# Patient Record
Sex: FEMALE | Race: WHITE | NOT HISPANIC OR LATINO | Employment: UNEMPLOYED | URBAN - METROPOLITAN AREA
[De-identification: names, ages, dates, MRNs, and addresses within clinical notes are randomized per-mention and may not be internally consistent; named-entity substitution may affect disease eponyms.]

---

## 2023-04-05 ENCOUNTER — ULTRASOUND (OUTPATIENT)
Dept: OBGYN CLINIC | Facility: CLINIC | Age: 28
End: 2023-04-05

## 2023-04-05 ENCOUNTER — TELEPHONE (OUTPATIENT)
Dept: OBGYN CLINIC | Facility: CLINIC | Age: 28
End: 2023-04-05

## 2023-04-05 VITALS — HEART RATE: 78 BPM | SYSTOLIC BLOOD PRESSURE: 172 MMHG | DIASTOLIC BLOOD PRESSURE: 94 MMHG | WEIGHT: 143 LBS

## 2023-04-05 DIAGNOSIS — N92.6 MISSED MENSES: Primary | ICD-10-CM

## 2023-04-05 LAB
BILIRUB UR QL STRIP: NEGATIVE
CLARITY UR: CLEAR
COLOR UR: NORMAL
GLUCOSE UR STRIP-MCNC: NEGATIVE MG/DL
HGB UR QL STRIP.AUTO: NEGATIVE
KETONES UR STRIP-MCNC: NEGATIVE MG/DL
LEUKOCYTE ESTERASE UR QL STRIP: NEGATIVE
NITRITE UR QL STRIP: NEGATIVE
PH UR STRIP.AUTO: 7 [PH]
PROT UR STRIP-MCNC: NEGATIVE MG/DL
SP GR UR STRIP.AUTO: 1.01 (ref 1–1.04)
UROBILINOGEN UA: NEGATIVE MG/DL

## 2023-04-05 NOTE — TELEPHONE ENCOUNTER
Pt informed, via  #[id number], that her lab levels were normal & she is not pregnant & can resume her birth control pills  Pt reminded about her appt on the 13th for birth control consult  Pt verbalized understanding  Pt asked if she can change the date of her apppointment, I said yes & she said she will call back to do that

## 2023-04-05 NOTE — PROGRESS NOTES
[address] [place] [address]   ULTRASOUND VISIT     SUBJECTIVE:  [employer] interpretor Veronique Alfaro [id number] used for Protuguese interpretation  Brief HPI: Gabriela Layton is a 32 y o  P2 female who presents for viability scan and dating for new pregnancy  Patient unsure of exact LMP, but states it was early b    Had a positive home pregnancy test, and subsequently had a negative home pregnancy test two Sundays ago  Denies bleeding, cramping, etc    Patient has two prior pregnancies  She does not desire to be pregnant at this time  OB History    Para Term  AB Living   2 2           SAB IAB Ectopic Multiple Live Births                  # Outcome Date GA Lbr Himanshu/2nd Weight Sex Delivery Anes PTL Lv   2 Para            1 Para                No past medical history on file  No past surgical history on file  Review of Systems   All other systems reviewed and are negative  OBJECTIVE:  There were no vitals filed for this visit  Physical Exam  Vitals reviewed  Constitutional:       General: She is not in acute distress  Appearance: She is well-developed  HENT:      Head: Normocephalic and atraumatic  Eyes:      Conjunctiva/sclera: Conjunctivae normal    Cardiovascular:      Rate and Rhythm: Normal rate  Pulmonary:      Effort: Pulmonary effort is normal    Abdominal:      General: There is no distension  Palpations: Abdomen is soft  Tenderness: There is no abdominal tenderness  Genitourinary:     Comments: Normal external genitalia  Musculoskeletal:         General: Normal range of motion  Skin:     General: Skin is warm and dry  Neurological:      General: No focal deficit present  Mental Status: She is alert and oriented to person, place, and time  Mental status is at baseline  Psychiatric:         Mood and Affect: Mood normal          Behavior: Behavior normal          Thought Content:  Thought content normal          FIRST TRIMESTER OBSTETRIC ULTRASOUND  INDICATION: Amenorrhea, viability  COMPARISON: None  TECHNIQUE:   Transvaginal imaging was performed to assess the gestation, myometrial/endometrial architecture and ovarian parenchymal detail  The study includes volumetric sweeps and traditional still imaging technique  FINDINGS:  UTERUS:  Retroverted uterus measuring, non-enlarged  No free fluid identified  Cervix appears normal    There is no intrauterine gestation identified  The uterine lining appears thin  ADNEXA:   Left ovary:  Left ovary normal in appearance  No adnexal mass or pathologic cyst       Right ovary:  Right ovary normal in appearance  No adnexal mass or pathologic cyst      IMPRESSION:  No intrauterine or extrauterine gestation identified  No free fluid  Uterus and adnexa normal in appearance  ASSESSMENT/PLAN:  Patient unlikely to be pregnant  Had negative pregnancy test at home   Did not provide urine sample for analysis today  HCG, progesterone ordered along with prenatal labs   Will notify patient of results  If not pregnant, patient can resume OCP  She is contemplating switching to another method  RTC 1 week to discuss contraception options  Update at 1434: serum HCG resulted < 1  Patient is not pregnant  Patient notified  Will return in 1 week to discuss contraception      Lang George MD  PGY-4, OBGYN  04/05/23

## 2023-04-05 NOTE — TELEPHONE ENCOUNTER
----- Message from Jerel Richard MD sent at 4/5/2023  5:17 PM EDT -----  Patient got bloodwork done   HCG is <1  She is not pregnant  Please call to notify her  She can resume OCP and return in 1 week to discuss birth control options

## 2024-10-09 ENCOUNTER — TELEPHONE (OUTPATIENT)
Dept: INTERNAL MEDICINE CLINIC | Facility: CLINIC | Age: 29
End: 2024-10-09

## 2025-07-08 ENCOUNTER — HOSPITAL ENCOUNTER (EMERGENCY)
Facility: HOSPITAL | Age: 30
Discharge: HOME | End: 2025-07-08
Attending: EMERGENCY MEDICINE

## 2025-07-08 ENCOUNTER — APPOINTMENT (EMERGENCY)
Dept: CT IMAGING | Facility: HOSPITAL | Age: 30
End: 2025-07-08

## 2025-07-08 ENCOUNTER — APPOINTMENT (EMERGENCY)
Dept: RADIOLOGY | Facility: HOSPITAL | Age: 30
End: 2025-07-08

## 2025-07-08 VITALS
RESPIRATION RATE: 18 BRPM | SYSTOLIC BLOOD PRESSURE: 179 MMHG | TEMPERATURE: 97.5 F | OXYGEN SATURATION: 98 % | HEART RATE: 86 BPM | DIASTOLIC BLOOD PRESSURE: 107 MMHG | WEIGHT: 130.07 LBS

## 2025-07-08 DIAGNOSIS — J90 PLEURAL EFFUSION ON LEFT: ICD-10-CM

## 2025-07-08 DIAGNOSIS — I31.39 PERICARDIAL EFFUSION: ICD-10-CM

## 2025-07-08 DIAGNOSIS — N64.4 BREAST PAIN, LEFT: Primary | ICD-10-CM

## 2025-07-08 DIAGNOSIS — R93.89 ABNORMAL CT OF THE CHEST: ICD-10-CM

## 2025-07-08 LAB
2HR DELTA HS TROPONIN: 0 NG/L
ALBUMIN SERPL BCG-MCNC: 4.3 G/DL (ref 3.5–5)
ALP SERPL-CCNC: 55 U/L (ref 34–104)
ALT SERPL W P-5'-P-CCNC: 17 U/L (ref 7–52)
ANION GAP SERPL CALCULATED.3IONS-SCNC: 8 MMOL/L (ref 4–13)
AST SERPL W P-5'-P-CCNC: 17 U/L (ref 13–39)
ATRIAL RATE: 93 BPM
ATRIAL RATE: 99 BPM
BASOPHILS # BLD AUTO: 0.07 THOUSANDS/ÂΜL (ref 0–0.1)
BASOPHILS NFR BLD AUTO: 1 % (ref 0–1)
BILIRUB SERPL-MCNC: 0.37 MG/DL (ref 0.2–1)
BUN SERPL-MCNC: 14 MG/DL (ref 5–25)
CALCIUM SERPL-MCNC: 8.7 MG/DL (ref 8.4–10.2)
CARDIAC TROPONIN I PNL SERPL HS: 4 NG/L (ref ?–50)
CARDIAC TROPONIN I PNL SERPL HS: 4 NG/L (ref ?–50)
CHLORIDE SERPL-SCNC: 105 MMOL/L (ref 96–108)
CO2 SERPL-SCNC: 23 MMOL/L (ref 21–32)
CREAT SERPL-MCNC: 0.56 MG/DL (ref 0.6–1.3)
D DIMER PPP FEU-MCNC: 0.55 UG/ML FEU
EOSINOPHIL # BLD AUTO: 0.26 THOUSAND/ÂΜL (ref 0–0.61)
EOSINOPHIL NFR BLD AUTO: 3 % (ref 0–6)
ERYTHROCYTE [DISTWIDTH] IN BLOOD BY AUTOMATED COUNT: 13.2 % (ref 11.6–15.1)
EXT PREGNANCY TEST URINE: NEGATIVE
EXT. CONTROL: NORMAL
GFR SERPL CREATININE-BSD FRML MDRD: 124 ML/MIN/1.73SQ M
GLUCOSE SERPL-MCNC: 102 MG/DL (ref 65–140)
HCT VFR BLD AUTO: 41.1 % (ref 34.8–46.1)
HGB BLD-MCNC: 14.1 G/DL (ref 11.5–15.4)
IMM GRANULOCYTES # BLD AUTO: 0.02 THOUSAND/UL (ref 0–0.2)
IMM GRANULOCYTES NFR BLD AUTO: 0 % (ref 0–2)
LYMPHOCYTES # BLD AUTO: 2.46 THOUSANDS/ÂΜL (ref 0.6–4.47)
LYMPHOCYTES NFR BLD AUTO: 27 % (ref 14–44)
MCH RBC QN AUTO: 30.1 PG (ref 26.8–34.3)
MCHC RBC AUTO-ENTMCNC: 34.3 G/DL (ref 31.4–37.4)
MCV RBC AUTO: 88 FL (ref 82–98)
MONOCYTES # BLD AUTO: 0.74 THOUSAND/ÂΜL (ref 0.17–1.22)
MONOCYTES NFR BLD AUTO: 8 % (ref 4–12)
NEUTROPHILS # BLD AUTO: 5.5 THOUSANDS/ÂΜL (ref 1.85–7.62)
NEUTS SEG NFR BLD AUTO: 61 % (ref 43–75)
NRBC BLD AUTO-RTO: 0 /100 WBCS
P AXIS: 39 DEGREES
P AXIS: 47 DEGREES
PLATELET # BLD AUTO: 295 THOUSANDS/UL (ref 149–390)
PMV BLD AUTO: 9.6 FL (ref 8.9–12.7)
POTASSIUM SERPL-SCNC: 3.8 MMOL/L (ref 3.5–5.3)
PR INTERVAL: 128 MS
PR INTERVAL: 130 MS
PROT SERPL-MCNC: 7.6 G/DL (ref 6.4–8.4)
QRS AXIS: 68 DEGREES
QRS AXIS: 75 DEGREES
QRSD INTERVAL: 84 MS
QRSD INTERVAL: 84 MS
QT INTERVAL: 346 MS
QT INTERVAL: 360 MS
QTC INTERVAL: 444 MS
QTC INTERVAL: 447 MS
RBC # BLD AUTO: 4.69 MILLION/UL (ref 3.81–5.12)
SODIUM SERPL-SCNC: 136 MMOL/L (ref 135–147)
T WAVE AXIS: 20 DEGREES
T WAVE AXIS: 24 DEGREES
VENTRICULAR RATE: 93 BPM
VENTRICULAR RATE: 99 BPM
WBC # BLD AUTO: 9.05 THOUSAND/UL (ref 4.31–10.16)

## 2025-07-08 PROCEDURE — 93010 ELECTROCARDIOGRAM REPORT: CPT | Performed by: INTERNAL MEDICINE

## 2025-07-08 PROCEDURE — 81025 URINE PREGNANCY TEST: CPT | Performed by: PHYSICIAN ASSISTANT

## 2025-07-08 PROCEDURE — 99284 EMERGENCY DEPT VISIT MOD MDM: CPT

## 2025-07-08 PROCEDURE — 93005 ELECTROCARDIOGRAM TRACING: CPT

## 2025-07-08 PROCEDURE — 71046 X-RAY EXAM CHEST 2 VIEWS: CPT

## 2025-07-08 PROCEDURE — 96374 THER/PROPH/DIAG INJ IV PUSH: CPT

## 2025-07-08 PROCEDURE — 85379 FIBRIN DEGRADATION QUANT: CPT | Performed by: PHYSICIAN ASSISTANT

## 2025-07-08 PROCEDURE — 36415 COLL VENOUS BLD VENIPUNCTURE: CPT | Performed by: PHYSICIAN ASSISTANT

## 2025-07-08 PROCEDURE — 80053 COMPREHEN METABOLIC PANEL: CPT | Performed by: PHYSICIAN ASSISTANT

## 2025-07-08 PROCEDURE — 99285 EMERGENCY DEPT VISIT HI MDM: CPT | Performed by: PHYSICIAN ASSISTANT

## 2025-07-08 PROCEDURE — 85025 COMPLETE CBC W/AUTO DIFF WBC: CPT | Performed by: PHYSICIAN ASSISTANT

## 2025-07-08 PROCEDURE — 84484 ASSAY OF TROPONIN QUANT: CPT | Performed by: PHYSICIAN ASSISTANT

## 2025-07-08 PROCEDURE — 71275 CT ANGIOGRAPHY CHEST: CPT

## 2025-07-08 RX ORDER — METHOCARBAMOL 500 MG/1
500 TABLET, FILM COATED ORAL 2 TIMES DAILY PRN
Qty: 20 TABLET | Refills: 0 | Status: SHIPPED | OUTPATIENT
Start: 2025-07-08

## 2025-07-08 RX ORDER — ACETAMINOPHEN 325 MG/1
975 TABLET ORAL ONCE
Status: COMPLETED | OUTPATIENT
Start: 2025-07-08 | End: 2025-07-08

## 2025-07-08 RX ORDER — IBUPROFEN 600 MG/1
600 TABLET, FILM COATED ORAL EVERY 6 HOURS PRN
Qty: 30 TABLET | Refills: 0 | Status: SHIPPED | OUTPATIENT
Start: 2025-07-08

## 2025-07-08 RX ORDER — ACETAMINOPHEN 500 MG
500 TABLET ORAL EVERY 6 HOURS PRN
Qty: 30 TABLET | Refills: 0 | Status: SHIPPED | OUTPATIENT
Start: 2025-07-08

## 2025-07-08 RX ORDER — METHOCARBAMOL 500 MG/1
500 TABLET, FILM COATED ORAL ONCE
Status: COMPLETED | OUTPATIENT
Start: 2025-07-08 | End: 2025-07-08

## 2025-07-08 RX ORDER — KETOROLAC TROMETHAMINE 30 MG/ML
15 INJECTION, SOLUTION INTRAMUSCULAR; INTRAVENOUS ONCE
Status: COMPLETED | OUTPATIENT
Start: 2025-07-08 | End: 2025-07-08

## 2025-07-08 RX ADMIN — METHOCARBAMOL 500 MG: 500 TABLET ORAL at 06:10

## 2025-07-08 RX ADMIN — IOHEXOL 71 ML: 350 INJECTION, SOLUTION INTRAVENOUS at 04:28

## 2025-07-08 RX ADMIN — ACETAMINOPHEN 975 MG: 325 TABLET ORAL at 06:10

## 2025-07-08 RX ADMIN — KETOROLAC TROMETHAMINE 15 MG: 30 INJECTION, SOLUTION INTRAMUSCULAR at 04:08

## 2025-07-08 NOTE — DISCHARGE INSTRUCTIONS
Você tem um achado anormal na sua tomografia computadorizada e precisará repeti-la em 3 meses. Consulte um médico de família para agendar isso.  Você tem juvenal pequena quantidade de líquido ao redor do pulmão esquerdo e do coração; consulte um cardiologista.  Consulte um obstetra/ginecologista para juvenal avaliação mais aprofundada da reyna jesika mamas.  [place] os medicamentos prescritos na farmácia para reyna jesika mamas.

## 2025-07-08 NOTE — ED PROVIDER NOTES
Time reflects when diagnosis was documented in both MDM as applicable and the Disposition within this note       Time User Action Codes Description Comment    7/8/2025  6:02 AM Yany Rivas Add [N64.4] Breast pain, left     7/8/2025  6:02 AM Tanja Rivassea Add [J90] Pleural effusion on left     7/8/2025  6:03 AM RivasTanjaYany Add [I31.39] Pericardial effusion     7/8/2025  6:11 AM Tanja Rivassea Add [R93.89] Abnormal CT of the chest           ED Disposition       ED Disposition   Discharge    Condition   Stable    Date/Time   Tue Jul 8, 2025  6:02 AM    Comment   Jani Cathryn discharge to home/self care.             Assessment & Plan       Medical Decision Making      DDx including but not limited to: mastitis, cellulitis, abscess, tumor, fibrocystic breast disease; doubt cardiac etiology or PE    Patient presenting to the ED for evaluation of left breast pain starting 3 days ago but has worsened.  She has no overlying erythema, edema or warmth to suggest infection.  No mass noted.  She is tender to the left upper breast mostly.  No nipple discharge or inversion.  She is also complaining of chest pain that is associated with shortness of breath and pleuritic chest pain.  There is no lower extremity edema.  She is mildly tachycardic to 100 initially however it improved to the low 90s while in the ED.  She is not in any respiratory distress, not hypoxic or tachypneic.  Will order CBC for evaluation of anemia and leukocytosis, CMP for evaluation of LFTs, kidney function electrolyte abnormalities.  Troponin for evaluation of heart strain. EKG for evaluation of arrhythmia and ACS.  CXR for evaluation of acute cardiopulmonary abnormalities.  D-dimer evaluate for PE given pleuritic chest pain.  She has not taken any medications for pain control.  Will treat with Toradol and reevaluate.  Urine preg for pregnancy status.    D-dimer elevated, will order CTA PE study.  Labs are otherwise without any  actionable abnormalities.  Patient is hypertensive here, is not on any antihypertensive.  Will need follow-up with PCP for hypertensive control.  CT with multiple incidental findings including trace left-sided pleural effusion and trace amount of fluid anterior to the left heart questionable pericardial fluid versus pleural effusion.  There is also a triangular soft tissue of the anterior mediastinum, likely due to residual thymus or thymic hyperplasia however recommend repeat CT chest with no contrast in 3 months.  Patient does not currently have any family doctor, referral be placed at time of discharge.  Emphasized to patient need for follow-up.  Patient given a referral for follow-up to OB/GYN as well given breast pain.  No overlying signs of infection however patient should have more focused workup if symptoms persist.  Patient given referral for cardiology given CT findings of trace pleural fluid and pericardial fluid.  Recommend patient schedule appointments, will possibly need echo.  Patient verbalized understanding and need for follow-up.    Prior to discharge, discharge instructions were discussed with patient at bedside. Patient was provided both verbal and written instructions. Patient is understanding of the discharge instructions and is agreeable to plan of care. Return precautions were discussed with patient bedside, patient verbalized understanding of signs and symptoms that would necessitate return to the ED. All questions were answered. Patient was comfortable with the plan of care and discharged to home.     Dispo: discharge home with follow up to PCP. Patient stable, in no acute distress and non-toxic at discharge.    Problems Addressed:  Abnormal CT of the chest: acute illness or injury  Breast pain, left: acute illness or injury  Pericardial effusion: acute illness or injury  Pleural effusion on left: acute illness or injury    Amount and/or Complexity of Data Reviewed  Labs: ordered.  Decision-making details documented in ED Course.  Radiology: ordered and independent interpretation performed. Decision-making details documented in ED Course.    Risk  OTC drugs.  Prescription drug management.        ED Course as of 07/08/25 0643 Tue Jul 08, 2025 0405 hs TnI 0hr: 4   0409 PREGNANCY TEST URINE: Negative   0558 CTA chest pe study  IMPRESSION:     With mild compromise by respiratory motion, no pulmonary embolus.     Small left pleural effusion.     Small amount of fluid anterior to the left heart, question trace pericardial effusion versus pleural fluid.     Triangular soft tissue in the anterior mediastinum interspersed with a small amount of fat. This is most likely due to residual thymus or thymic hyperplasia, but recommend follow-up with a chest CT with no contrast in 3 months to assure stability and exclude a mediastinal mass.      0602 Delta 2hr hsTnI: 0       Medications   ketorolac (TORADOL) injection 15 mg (15 mg Intravenous Given 7/8/25 0408)   iohexol (OMNIPAQUE) 350 MG/ML injection (MULTI-DOSE) 71 mL (71 mL Intravenous Given 7/8/25 0428)   methocarbamol (ROBAXIN) tablet 500 mg (500 mg Oral Given 7/8/25 0610)   acetaminophen (TYLENOL) tablet 975 mg (975 mg Oral Given 7/8/25 0610)       ED Risk Strat Scores      HEART Risk Score      Flowsheet Row Most Recent Value   Heart Score Risk Calculator    History 0 Filed at: 07/08/2025 0640   ECG 0 Filed at: 07/08/2025 0640   Age 0 Filed at: 07/08/2025 0640   Risk Factors 0 Filed at: 07/08/2025 0640   Troponin 0 Filed at: 07/08/2025 0640   HEART Score 0 Filed at: 07/08/2025 0640                      No data recorded        SBIRT 22yo+      Flowsheet Row Most Recent Value   Initial Alcohol Screen: US AUDIT-C     1. How often do you have a drink containing alcohol? 0 Filed at: 07/08/2025 0341   2. How many drinks containing alcohol do you have on a typical day you are drinking?  0 Filed at: 07/08/2025 0341   3b. FEMALE Any Age, or MALE 65+: How  "often do you have 4 or more drinks on one occassion? 0 Filed at: 07/08/2025 0341   Audit-C Score 0 Filed at: 07/08/2025 0341   AC: How many times in the past year have you...    Used an illegal drug or used a prescription medication for non-medical reasons? Never Filed at: 07/08/2025 0341                            History of Present Illness       Chief Complaint   Patient presents with    Breast Pain     Patient c/o left breast pain that started 3 days ago. Patient states breast pain is like a pressure in her chest and makes it hard for her to breath       Past Medical History[1]   Past Surgical History[2]   Family History[3]   Social History[4]   E-Cigarette/Vaping      E-Cigarette/Vaping Substances      I have reviewed and agree with the history as documented.     This is a 29-year-old female with history of cholecystectomy presenting to the ED for evaluation of left breast pain x 3 days.  She states initially pain was mild and has increased in severity, described as a burning sensation.  She states that several years ago she was told she had a \"nodule\" in this left breast but has never caused her problems.  She denies any erythema, edema, lumps or warmth to the breast.  She denies any nipple drainage or bleeding.  She has not taken any medications for pain.  She does have some chest pain as well.  She states that she has pleuritic chest pain and shortness of breath with ambulating.  She feels that she is unable to take a deep breath due to pain.  She denies any fevers, chills, nausea, vomiting.  She denies any lower extremity edema.  She denies any recent travel or prolonged immobilization.      History provided by:  Patient   used: Yes ([employer] (Bul[place]ian))        Review of Systems   Constitutional:  Negative for chills and fever.   Respiratory:  Positive for shortness of breath (w/ exertion).    Cardiovascular:  Positive for chest pain. Negative for palpitations and leg swelling. "   Gastrointestinal:  Negative for abdominal pain, nausea and vomiting.   Genitourinary:  Negative for dysuria.   Musculoskeletal:  Negative for back pain.        Breast pain   Neurological:  Negative for light-headedness.   All other systems reviewed and are negative.          Objective       ED Triage Vitals   Temperature Pulse Blood Pressure Respirations SpO2 Patient Position - Orthostatic VS   07/08/25 0308 07/08/25 0306 07/08/25 0306 07/08/25 0306 07/08/25 0306 07/08/25 0415   97.5 °F (36.4 °C) 100 (!) 158/110 20 98 % Sitting      Temp Source Heart Rate Source BP Location FiO2 (%) Pain Score    07/08/25 0308 07/08/25 0415 07/08/25 0415 -- 07/08/25 0408    Oral Monitor Right arm  8      Vitals      Date and Time Temp Pulse SpO2 Resp BP Pain Score FACES Pain Rating User   07/08/25 0610 -- -- -- -- -- 7 --    07/08/25 0600 -- 86 98 % 18 179/107 -- --    07/08/25 0538 -- -- -- -- -- 7 --    07/08/25 0415 -- 87 98 % 18 179/106 -- --    07/08/25 0408 -- -- -- -- -- 8 --    07/08/25 0308 97.5 °F (36.4 °C) -- -- -- -- -- --    07/08/25 0306 -- 100 98 % 20 158/110 -- --             Physical Exam  Vitals reviewed.   Constitutional:       General: She is not in acute distress.     Appearance: Normal appearance. She is well-developed and well-groomed. She is not ill-appearing.   HENT:      Head: Normocephalic and atraumatic.      Right Ear: External ear normal.      Left Ear: External ear normal.      Nose: Nose normal.     Eyes:      General: No scleral icterus.     Conjunctiva/sclera: Conjunctivae normal.       Cardiovascular:      Rate and Rhythm: Regular rhythm. Tachycardia present.      Heart sounds: No murmur heard.     No friction rub. No gallop.   Pulmonary:      Effort: Pulmonary effort is normal. No tachypnea, accessory muscle usage or respiratory distress.      Breath sounds: Normal breath sounds. No stridor. No wheezing, rhonchi or rales.   Chest:   Breasts:     Right: Normal.      Left: Tenderness  present. No swelling, inverted nipple, mass, nipple discharge or skin change.      Comments: TTP over the left upper breast.  No overlying erythema, warmth or edema.  No mass noted.  No nipple discharge or inversion.  No overlying skin changes.  Abdominal:      General: Abdomen is flat. There is no distension.      Palpations: Abdomen is soft.      Tenderness: There is no abdominal tenderness.     Musculoskeletal:         General: No deformity. Normal range of motion.      Cervical back: Normal range of motion.      Right lower leg: No edema.      Left lower leg: No edema.     Skin:     Coloration: Skin is not jaundiced or pale.      Findings: No lesion or rash.     Neurological:      Mental Status: She is alert and oriented to person, place, and time.     Psychiatric:         Mood and Affect: Mood normal.         Behavior: Behavior normal. Behavior is cooperative.         Results Reviewed       Procedure Component Value Units Date/Time    HS Troponin I 2hr [[id number]]  (Normal) Collected: 07/08/25 0534    Lab Status: Final result Specimen: Blood from Arm, Right Updated: 07/08/25 0602     hs TnI 2hr 4 ng/L      Delta 2hr hsTnI 0 ng/L     POCT pregnancy, urine [[id number]]  (Normal) Collected: 07/08/25 0408    Lab Status: Final result Updated: 07/08/25 0408     EXT Preg Test, Ur Negative     Control Valid    HS Troponin 0hr (reflex protocol) [[id number]]  (Normal) Collected: 07/08/25 0337    Lab Status: Final result Specimen: Blood from Arm, Right Updated: 07/08/25 0405     hs TnI 0hr 4 ng/L     Comprehensive metabolic panel [[id number]]  (Abnormal) Collected: 07/08/25 0337    Lab Status: Final result Specimen: Blood from Arm, Right Updated: 07/08/25 0401     Sodium 136 mmol/L      Potassium 3.8 mmol/L      Chloride 105 mmol/L      CO2 23 mmol/L      ANION GAP 8 mmol/L      BUN 14 mg/dL      Creatinine 0.56 mg/dL      Glucose 102 mg/dL      Calcium 8.7 mg/dL      AST 17 U/L      ALT 17 U/L      Alkaline Phosphatase 55  U/L      Total Protein 7.6 g/dL      Albumin 4.3 g/dL      Total Bilirubin 0.37 mg/dL      eGFR 124 ml/min/1.73sq m     Narrative:      National Kidney Disease Foundation guidelines for Chronic Kidney Disease (CKD):     Stage 1 with normal or high GFR (GFR > 90 mL/min/1.73 square meters)    Stage 2 Mild CKD (GFR = 60-89 mL/min/1.73 square meters)    Stage 3A Moderate CKD (GFR = 45-59 mL/min/1.73 square meters)    Stage 3B Moderate CKD (GFR = 30-44 mL/min/1.73 square meters)    Stage 4 Severe CKD (GFR = 15-29 mL/min/1.73 square meters)    Stage 5 End Stage CKD (GFR <15 mL/min/1.73 square meters)  Note: GFR calculation is accurate only with a steady state creatinine    D-Dimer [[id number]]  (Abnormal) Collected: 07/08/25 0337    Lab Status: Final result Specimen: Blood from Arm, Right Updated: 07/08/25 0400     D-Dimer, Quant 0.55 ug/ml FEU     CBC and differential [[id number]] Collected: 07/08/25 0337    Lab Status: Final result Specimen: Blood from Arm, Right Updated: 07/08/25 0344     WBC 9.05 Thousand/uL      RBC 4.69 Million/uL      Hemoglobin 14.1 g/dL      Hematocrit 41.1 %      MCV 88 fL      MCH 30.1 pg      MCHC 34.3 g/dL      RDW 13.2 %      MPV 9.6 fL      Platelets 295 Thousands/uL      nRBC 0 /100 WBCs      Segmented % 61 %      Immature Grans % 0 %      Lymphocytes % 27 %      Monocytes % 8 %      Eosinophils Relative 3 %      Basophils Relative 1 %      Absolute Neutrophils 5.50 Thousands/µL      Absolute Immature Grans 0.02 Thousand/uL      Absolute Lymphocytes 2.46 Thousands/µL      Absolute Monocytes 0.74 Thousand/µL      Eosinophils Absolute 0.26 Thousand/µL      Basophils Absolute 0.07 Thousands/µL             CTA chest pe study   Final Interpretation by Savannah Sandy MD (07/08 0542)      With mild compromise by respiratory motion, no pulmonary embolus.      Small left pleural effusion.      Small amount of fluid anterior to the left heart, question trace pericardial effusion versus pleural  fluid.      Triangular soft tissue in the anterior mediastinum interspersed with a small amount of fat. This is most likely due to residual thymus or thymic hyperplasia, but recommend follow-up with a chest CT with no contrast in 3 months to assure stability and    exclude a mediastinal mass.      This study was marked in Epic for immediate notification and follow-up.      Computerized Assisted Algorithm (CAA) aided analysis of applicable images.            Workstation performed: [id number]         XR chest 2 views   ED Interpretation by Yany Rivas PA-C (07/08 0347)   No acute cardiopulmonary disease as interpreted by me at this time.          ECG 12 Lead Documentation Only    Date/Time: 7/8/2025 3:55 AM    Performed by: Yany Rivas PA-C  Authorized by: Yany Rivas PA-C    Indications / Diagnosis:  Chest Pain  ECG reviewed by me, the ED Provider: yes    Patient location:  ED  Previous ECG:     Previous ECG:  Unavailable    Comparison to cardiac monitor: No    Interpretation:     Interpretation: normal    Quality:     Tracing quality:  Limited by artifact  Rate:     ECG rate:  93    ECG rate assessment: normal    Rhythm:     Rhythm: sinus rhythm    Ectopy:     Ectopy: none    QRS:     QRS axis:  Normal    QRS intervals:  Normal  Conduction:     Conduction: normal    ST segments:     ST segments:  Non-specific  T waves:     T waves: normal    ECG 12 Lead Documentation Only    Date/Time: 7/8/2025 5:35 AM    Performed by: Yany Rivas PA-C  Authorized by: Yany Rivas PA-C    Indications / Diagnosis:  Delta  ECG reviewed by me, the ED Provider: yes    Patient location:  ED  Previous ECG:     Previous ECG:  Compared to current    Comparison to cardiac monitor: No    Interpretation:     Interpretation: normal    Quality:     Tracing quality:  Limited by artifact  Rate:     ECG rate:  99    ECG rate assessment: normal    Rhythm:     Rhythm: sinus rhythm    Ectopy:     Ectopy: none    QRS:     QRS  axis:  Normal    QRS intervals:  Normal  Conduction:     Conduction: normal    ST segments:     ST segments:  Normal  T waves:     T waves: normal        ED Medication and Procedure Management   None     Discharge Medication List as of 2025  6:16 AM        START taking these medications    Details   acetaminophen (TYLENOL) 500 mg tablet Take 1 tablet (500 mg total) by mouth every 6 (six) hours as needed for mild pain, Starting 2025, Normal      ibuprofen (MOTRIN) 600 mg tablet Take 1 tablet (600 mg total) by mouth every 6 (six) hours as needed for mild pain, Starting 2025, Normal      methocarbamol (ROBAXIN) 500 mg tablet Take 1 tablet (500 mg total) by mouth 2 (two) times a day as needed for muscle spasms, Starting 2025, Normal             ED SEPSIS DOCUMENTATION   Time reflects when diagnosis was documented in both MDM as applicable and the Disposition within this note       Time User Action Codes Description Comment    2025  6:02 AM Yany Rivas [N64.4] Breast pain, left     2025  6:02 AM Yany Rivas [J90] Pleural effusion on left     2025  6:03 AM Yany Rivas [I31.39] Pericardial effusion     2025  6:11 AM Yany Rivas [R93.89] Abnormal CT of the chest                      [1] No past medical history on file.  [2]   Past Surgical History:  Procedure Laterality Date     SECTION      GALLBLADDER SURGERY     [3] No family history on file.  [4]         Yany Rivas PA-C  [phone]

## 2025-07-08 NOTE — ED NOTES
Patient ambulatory without assistance. Ambulatory pulse oxygen above 97%.     Haylee Khan RN  07/08/25 0631